# Patient Record
Sex: FEMALE | Race: WHITE | NOT HISPANIC OR LATINO | Employment: FULL TIME | URBAN - METROPOLITAN AREA
[De-identification: names, ages, dates, MRNs, and addresses within clinical notes are randomized per-mention and may not be internally consistent; named-entity substitution may affect disease eponyms.]

---

## 2017-03-16 ENCOUNTER — TRANSCRIBE ORDERS (OUTPATIENT)
Dept: ADMINISTRATIVE | Facility: HOSPITAL | Age: 47
End: 2017-03-16

## 2017-03-16 DIAGNOSIS — N63.0 LUMP OR MASS IN BREAST: Primary | ICD-10-CM

## 2017-03-21 ENCOUNTER — HOSPITAL ENCOUNTER (OUTPATIENT)
Dept: MAMMOGRAPHY | Facility: CLINIC | Age: 47
Discharge: HOME | End: 2017-03-21
Payer: COMMERCIAL

## 2017-03-21 ENCOUNTER — HOSPITAL ENCOUNTER (OUTPATIENT)
Dept: ULTRASOUND IMAGING | Facility: CLINIC | Age: 47
Discharge: HOME | End: 2017-03-21
Payer: COMMERCIAL

## 2017-03-21 DIAGNOSIS — N63.0 LUMP OR MASS IN BREAST: ICD-10-CM

## 2017-03-21 PROCEDURE — 76642 ULTRASOUND BREAST LIMITED: CPT

## 2017-03-21 PROCEDURE — G0204 DX MAMMO INCL CAD BI: HCPCS

## 2018-03-15 ENCOUNTER — OFFICE VISIT (OUTPATIENT)
Dept: OBGYN CLINIC | Facility: CLINIC | Age: 48
End: 2018-03-15
Payer: COMMERCIAL

## 2018-03-15 ENCOUNTER — APPOINTMENT (OUTPATIENT)
Dept: RADIOLOGY | Facility: CLINIC | Age: 48
End: 2018-03-15
Payer: COMMERCIAL

## 2018-03-15 VITALS — HEIGHT: 61 IN | SYSTOLIC BLOOD PRESSURE: 128 MMHG | DIASTOLIC BLOOD PRESSURE: 80 MMHG | HEART RATE: 95 BPM

## 2018-03-15 DIAGNOSIS — M25.561 RIGHT KNEE PAIN, UNSPECIFIED CHRONICITY: ICD-10-CM

## 2018-03-15 DIAGNOSIS — M70.50 ANSERINE BURSITIS: ICD-10-CM

## 2018-03-15 DIAGNOSIS — M17.11 PRIMARY LOCALIZED OSTEOARTHRITIS OF RIGHT KNEE: Primary | ICD-10-CM

## 2018-03-15 PROCEDURE — 20610 DRAIN/INJ JOINT/BURSA W/O US: CPT | Performed by: ORTHOPAEDIC SURGERY

## 2018-03-15 PROCEDURE — 73564 X-RAY EXAM KNEE 4 OR MORE: CPT

## 2018-03-15 PROCEDURE — 99213 OFFICE O/P EST LOW 20 MIN: CPT | Performed by: ORTHOPAEDIC SURGERY

## 2018-03-15 RX ORDER — BETAMETHASONE SODIUM PHOSPHATE AND BETAMETHASONE ACETATE 3; 3 MG/ML; MG/ML
6 INJECTION, SUSPENSION INTRA-ARTICULAR; INTRALESIONAL; INTRAMUSCULAR; SOFT TISSUE
Status: COMPLETED | OUTPATIENT
Start: 2018-03-15 | End: 2018-03-15

## 2018-03-15 RX ORDER — LIDOCAINE HYDROCHLORIDE 10 MG/ML
5 INJECTION, SOLUTION INFILTRATION; PERINEURAL
Status: COMPLETED | OUTPATIENT
Start: 2018-03-15 | End: 2018-03-15

## 2018-03-15 RX ORDER — SENNOSIDES 8.6 MG
650 CAPSULE ORAL EVERY 8 HOURS PRN
COMMUNITY

## 2018-03-15 RX ADMIN — BETAMETHASONE SODIUM PHOSPHATE AND BETAMETHASONE ACETATE 6 MG: 3; 3 INJECTION, SUSPENSION INTRA-ARTICULAR; INTRALESIONAL; INTRAMUSCULAR; SOFT TISSUE at 13:59

## 2018-03-15 RX ADMIN — LIDOCAINE HYDROCHLORIDE 5 ML: 10 INJECTION, SOLUTION INFILTRATION; PERINEURAL at 13:59

## 2018-03-15 NOTE — ASSESSMENT & PLAN NOTE
Right knee anserine bursitis from the recent injury  This is likely less problematic than her osteoarthritis  Will monitor the bursitis  If she fails to have improvement with the injection, rest, ice, and anti-inflammatories I will see her back in 3-4 weeks and consider an injection in the bursa if appropriate  She was counseled on stretching

## 2018-03-15 NOTE — PATIENT INSTRUCTIONS
Ice your knee 20-30 mins every hour whenever there is swelling  Also ice in the same way for several hours after activity that causes pain or swelling

## 2018-03-15 NOTE — ASSESSMENT & PLAN NOTE
Right knee osteoarthritis aggravated by the recent injury  Recommended icing regularly and anti-inflammatory use  I also offered her cortisone injection  Please refer to the procedure note  She has no restrictions  I will see her back as needed

## 2018-03-15 NOTE — PROGRESS NOTES
Assessment:     1  Right knee pain, unspecified chronicity        Plan:     Problem List Items Addressed This Visit        Musculoskeletal and Integument    Primary localized osteoarthritis of right knee - Primary     Right knee osteoarthritis aggravated by the recent injury  Recommended icing regularly and anti-inflammatory use  I also offered her cortisone injection  Please refer to the procedure note  She has no restrictions  I will see her back as needed  Relevant Orders    XR knee 4+ vw right injury    Anserine bursitis     Right knee anserine bursitis from the recent injury  This is likely less problematic than her osteoarthritis  Will monitor the bursitis  If she fails to have improvement with the injection, rest, ice, and anti-inflammatories I will see her back in 3-4 weeks and consider an injection in the bursa if appropriate  She was counseled on stretching  Subjective:     Patient ID: Zandra Mckeon is a 51 y o  female  Chief Complaint:  54-year-old female here today for her right knee  On February 13, 2018 she was squatting down taking care of her dogs, giving the bath  Since that time she has had severe pain in her right knee  She is having difficulty walking  Sitting for any length of time and then getting up is very difficult  She notes limping  She has trouble with plantar fasciitis when her knees are bad  She was given a cortisone injection three years ago in her right knee for arthritis and has been doing very well since that time  She points the medial aspect of the knee as to where the pain is  She has not been icing or taking anti-inflammatories        Allergy:  Allergies   Allergen Reactions    Augmentin Es-600  [Amoxicillin-Pot Clavulanate] Vomiting    Ciprofloxacin Vomiting    Mobic [Meloxicam]      Medications:  all current active meds have been reviewed  Past Medical History:  Past Medical History:   Diagnosis Date    Fractures     Osteoarthritis Past Surgical History:  Past Surgical History:   Procedure Laterality Date    APPENDECTOMY       SECTION      VARICOCELECTOMY       Family History:  Family History   Problem Relation Age of Onset    No Known Problems Mother     No Known Problems Father      Social History:  History   Alcohol Use    Yes     History   Drug Use No     History   Smoking Status    Current Every Day Smoker   Smokeless Tobacco    Never Used     Review of Systems   Constitutional: Negative  HENT: Negative  Eyes: Negative  Respiratory: Negative  Gastrointestinal: Negative  Endocrine: Negative  Genitourinary: Negative  Musculoskeletal: Positive for arthralgias and joint swelling  Skin: Negative  Allergic/Immunologic: Negative  Neurological: Negative  Hematological: Negative  Psychiatric/Behavioral: Negative  Objective:  BP Readings from Last 1 Encounters:   03/15/18 128/80      Wt Readings from Last 1 Encounters:   05/29/15 127 kg (279 lb)      BMI:   Estimated body mass index is 52 72 kg/m² as calculated from the following:    Height as of 5/29/15: 5' 1" (1 549 m)  Weight as of 5/29/15: 127 kg (279 lb)  BSA:   Estimated body surface area is 2 18 meters squared as calculated from the following:    Height as of 5/29/15: 5' 1" (1 549 m)  Weight as of 5/29/15: 127 kg (279 lb)  Physical Exam  Right Knee Exam     Comments:  Tender to palpation along the medial joint line as well as the hamstring tendons down to their insertion at the pes bursa  Knee range of motion is  degrees  The knee is stable to varus and valgus stress with a negative Lachman  No knee effusion  Pain and discomfort with any Ruby testing and difficult to localize specifically  No swelling, erythema              I have personally reviewed pertinent films in PACS and my interpretation is Moderate to severe osteoarthritis of the right knee with near complete collapse of the medial joint space        Large joint arthrocentesis  Date/Time: 3/15/2018 1:59 PM  Consent given by: patient  Timeout: Immediately prior to procedure a time out was called to verify the correct patient, procedure, equipment, support staff and site/side marked as required   Supporting Documentation  Indications: pain   Procedure Details  Location: knee -   Preparation: Patient was prepped and draped in the usual sterile fashion  Needle size: 22 G  Ultrasound guidance: no  Approach: superior  Medications administered: 5 mL lidocaine 1 %; 6 mg betamethasone acetate-betamethasone sodium phosphate 6 (3-3) mg/mL    Patient tolerance: patient tolerated the procedure well with no immediate complications  Dressing:  Sterile dressing applied

## 2018-07-27 ENCOUNTER — OFFICE VISIT (OUTPATIENT)
Dept: OBGYN CLINIC | Facility: CLINIC | Age: 48
End: 2018-07-27
Payer: COMMERCIAL

## 2018-07-27 VITALS
SYSTOLIC BLOOD PRESSURE: 118 MMHG | WEIGHT: 271.6 LBS | HEIGHT: 61 IN | BODY MASS INDEX: 51.28 KG/M2 | DIASTOLIC BLOOD PRESSURE: 80 MMHG | HEART RATE: 69 BPM

## 2018-07-27 DIAGNOSIS — M17.0 PRIMARY OSTEOARTHRITIS OF BOTH KNEES: Primary | ICD-10-CM

## 2018-07-27 PROCEDURE — 99213 OFFICE O/P EST LOW 20 MIN: CPT | Performed by: ORTHOPAEDIC SURGERY

## 2018-07-27 PROCEDURE — 20610 DRAIN/INJ JOINT/BURSA W/O US: CPT | Performed by: ORTHOPAEDIC SURGERY

## 2018-07-27 RX ORDER — BETAMETHASONE SODIUM PHOSPHATE AND BETAMETHASONE ACETATE 3; 3 MG/ML; MG/ML
6 INJECTION, SUSPENSION INTRA-ARTICULAR; INTRALESIONAL; INTRAMUSCULAR; SOFT TISSUE
Status: COMPLETED | OUTPATIENT
Start: 2018-07-27 | End: 2018-07-27

## 2018-07-27 RX ORDER — LIDOCAINE HYDROCHLORIDE 10 MG/ML
5 INJECTION, SOLUTION INFILTRATION; PERINEURAL
Status: COMPLETED | OUTPATIENT
Start: 2018-07-27 | End: 2018-07-27

## 2018-07-27 RX ADMIN — LIDOCAINE HYDROCHLORIDE 5 ML: 10 INJECTION, SOLUTION INFILTRATION; PERINEURAL at 14:37

## 2018-07-27 RX ADMIN — BETAMETHASONE SODIUM PHOSPHATE AND BETAMETHASONE ACETATE 6 MG: 3; 3 INJECTION, SUSPENSION INTRA-ARTICULAR; INTRALESIONAL; INTRAMUSCULAR; SOFT TISSUE at 14:37

## 2018-07-27 NOTE — PROGRESS NOTES
Assessment:   No diagnosis found  Plan:     Problem List Items Addressed This Visit        Musculoskeletal and Integument    Primary osteoarthritis of both knees - Primary     50-year-old female with bilateral knee osteoarthritis  She wished to proceed with cortisone injections today  Please refer to the procedure note  We had a lengthy discussion regarding total knee replacements and weight loss  I do think that if she can lose a significant amount of weight it will likely delayed when she needs knee replacement by years  I spent time with her talking about appropriate ways to lose weight including changes in her eating habits, joining weight watchers, low-impact exercising  She is working with her primary care doctor on his well and encouraged her to continue working on this  I will see her back as needed  Relevant Orders    Large joint arthrocentesis    Large joint arthrocentesis           Patient ID: Jada Reaves is a 51 y o  female  Chief Complaint:  Bilateral knee pain    Subjective:  50-year-old female with bilateral knee pain  The right she has gotten injections in occasionally and has good relief with those for quite a period of time  Most recent one was over three months ago  The right knee is bothering her a lot and she is getting sharp pain in the knee, primarily around the medial joint line  Left knee also is bothering her  She feels like it is buckling especially if she is doing something like stairs  She does not trust it  She has pain in the front of her knee as well  She is working with her primary care doctor and weight loss, but feels very overwhelmed that she is not able to do it  Allergy:  Allergies   Allergen Reactions    Augmentin Es-600  [Amoxicillin-Pot Clavulanate] Vomiting    Ciprofloxacin Vomiting    Mobic [Meloxicam]        Medications:  all current active meds have been reviewed    ROS:  Review of Systems   Constitutional: Negative      HENT: Negative  Eyes: Negative  Respiratory: Negative  Gastrointestinal: Negative  Endocrine: Negative  Genitourinary: Negative  Musculoskeletal: Positive for arthralgias and joint swelling  Skin: Negative  Allergic/Immunologic: Negative  Neurological: Negative  Hematological: Negative  Psychiatric/Behavioral: Negative          Objective:  BP Readings from Last 1 Encounters:   07/27/18 118/80      Wt Readings from Last 1 Encounters:   07/27/18 123 kg (271 lb 9 6 oz)        Exam:   Physical Exam  Right Knee Exam     Comments:  Range of motion:  , stable to varus and valgus stress, medial joint line tenderness, no other tenderness, crepitus on knee range of motion, pain with range of motion, discomfort with Ruby's, no knee effusion      Left Knee Exam     Comments:  Stable to varus and valgus stress, patellofemoral pain, crepitus on range of motion, Range of motion:  0-130, no knee effusion, negative Ruby's                  Large joint arthrocentesis  Date/Time: 7/27/2018 2:37 PM  Consent given by: patient  Timeout: Immediately prior to procedure a time out was called to verify the correct patient, procedure, equipment, support staff and site/side marked as required   Supporting Documentation  Indications: pain   Procedure Details  Location: knee - R knee  Preparation: Patient was prepped and draped in the usual sterile fashion  Needle size: 22 G  Ultrasound guidance: no  Approach: superior  Medications administered: 6 mg betamethasone acetate-betamethasone sodium phosphate 6 (3-3) mg/mL; 5 mL lidocaine 1 %    Patient tolerance: patient tolerated the procedure well with no immediate complications  Dressing:  Sterile dressing applied  Large joint arthrocentesis  Date/Time: 7/27/2018 2:37 PM  Consent given by: patient  Timeout: Immediately prior to procedure a time out was called to verify the correct patient, procedure, equipment, support staff and site/side marked as required Supporting Documentation  Indications: pain   Procedure Details  Location: knee - L knee  Preparation: Patient was prepped and draped in the usual sterile fashion  Needle size: 22 G  Ultrasound guidance: no  Approach: superior  Medications administered: 6 mg betamethasone acetate-betamethasone sodium phosphate 6 (3-3) mg/mL; 5 mL lidocaine 1 %    Patient tolerance: patient tolerated the procedure well with no immediate complications  Dressing:  Sterile dressing applied

## 2018-07-27 NOTE — ASSESSMENT & PLAN NOTE
50-year-old female with bilateral knee osteoarthritis  She wished to proceed with cortisone injections today  Please refer to the procedure note  We had a lengthy discussion regarding total knee replacements and weight loss  I do think that if she can lose a significant amount of weight it will likely delayed when she needs knee replacement by years  I spent time with her talking about appropriate ways to lose weight including changes in her eating habits, joining weight watchers, low-impact exercising  She is working with her primary care doctor on his well and encouraged her to continue working on this  I will see her back as needed

## 2021-01-08 ENCOUNTER — IMMUNIZATIONS (OUTPATIENT)
Dept: FAMILY MEDICINE CLINIC | Facility: HOSPITAL | Age: 51
End: 2021-01-08

## 2021-01-08 DIAGNOSIS — Z23 ENCOUNTER FOR IMMUNIZATION: ICD-10-CM

## 2021-01-08 PROCEDURE — 91301 SARS-COV-2 / COVID-19 MRNA VACCINE (MODERNA) 100 MCG: CPT

## 2021-01-08 PROCEDURE — 0011A SARS-COV-2 / COVID-19 MRNA VACCINE (MODERNA) 100 MCG: CPT

## 2021-02-04 ENCOUNTER — IMMUNIZATIONS (OUTPATIENT)
Dept: FAMILY MEDICINE CLINIC | Facility: HOSPITAL | Age: 51
End: 2021-02-04

## 2021-02-04 DIAGNOSIS — Z23 ENCOUNTER FOR IMMUNIZATION: Primary | ICD-10-CM

## 2021-02-04 PROCEDURE — 91301 SARS-COV-2 / COVID-19 MRNA VACCINE (MODERNA) 100 MCG: CPT

## 2021-02-04 PROCEDURE — 0012A SARS-COV-2 / COVID-19 MRNA VACCINE (MODERNA) 100 MCG: CPT

## 2021-12-13 ENCOUNTER — OFFICE VISIT (OUTPATIENT)
Dept: URGENT CARE | Facility: CLINIC | Age: 51
End: 2021-12-13
Payer: COMMERCIAL

## 2021-12-13 ENCOUNTER — APPOINTMENT (OUTPATIENT)
Dept: RADIOLOGY | Facility: CLINIC | Age: 51
End: 2021-12-13
Payer: COMMERCIAL

## 2021-12-13 VITALS
BODY MASS INDEX: 49.65 KG/M2 | WEIGHT: 263 LBS | DIASTOLIC BLOOD PRESSURE: 65 MMHG | HEIGHT: 61 IN | HEART RATE: 60 BPM | SYSTOLIC BLOOD PRESSURE: 130 MMHG | OXYGEN SATURATION: 96 % | TEMPERATURE: 96.5 F | RESPIRATION RATE: 20 BRPM

## 2021-12-13 DIAGNOSIS — R07.0 THROAT DISCOMFORT: Primary | ICD-10-CM

## 2021-12-13 DIAGNOSIS — R07.0 THROAT DISCOMFORT: ICD-10-CM

## 2021-12-13 PROCEDURE — 71046 X-RAY EXAM CHEST 2 VIEWS: CPT

## 2021-12-13 PROCEDURE — S9083 URGENT CARE CENTER GLOBAL: HCPCS

## 2021-12-13 PROCEDURE — G0382 LEV 3 HOSP TYPE B ED VISIT: HCPCS

## 2022-01-13 ENCOUNTER — OCCMED (OUTPATIENT)
Dept: URGENT CARE | Facility: CLINIC | Age: 52
End: 2022-01-13
Payer: OTHER MISCELLANEOUS

## 2022-01-13 ENCOUNTER — APPOINTMENT (OUTPATIENT)
Dept: RADIOLOGY | Facility: CLINIC | Age: 52
End: 2022-01-13
Payer: OTHER MISCELLANEOUS

## 2022-01-13 ENCOUNTER — TELEPHONE (OUTPATIENT)
Dept: URGENT CARE | Facility: CLINIC | Age: 52
End: 2022-01-13

## 2022-01-13 DIAGNOSIS — M25.511 ACUTE PAIN OF RIGHT SHOULDER: ICD-10-CM

## 2022-01-13 DIAGNOSIS — R07.81 RIB PAIN ON RIGHT SIDE: ICD-10-CM

## 2022-01-13 DIAGNOSIS — M25.511 ACUTE PAIN OF RIGHT SHOULDER: Primary | ICD-10-CM

## 2022-01-13 PROCEDURE — 99283 EMERGENCY DEPT VISIT LOW MDM: CPT | Performed by: PHYSICIAN ASSISTANT

## 2022-01-13 PROCEDURE — 73030 X-RAY EXAM OF SHOULDER: CPT

## 2022-01-13 PROCEDURE — G0382 LEV 3 HOSP TYPE B ED VISIT: HCPCS | Performed by: PHYSICIAN ASSISTANT

## 2022-01-13 PROCEDURE — 71101 X-RAY EXAM UNILAT RIBS/CHEST: CPT

## 2022-01-13 NOTE — TELEPHONE ENCOUNTER
Spoke with patient  Patient was told Xray report was read as Hazy groundglass opacities at both lung bases, new from prior study, concerning for atypical infectious process  In the setting of clinically suspected/proven COVID-19, the above lung parenchymal findings on CT indicate high confidence level for   COVID-19   2   No evidence of rib fractures  Patient was called and reports no signs of illness and that she was tested on 1/10/22 and was negative for COVID 19  Patient was told I recommend she contact PCP for further testing and possible New COVId 19 testing  Patient understood  Patient also reports she had the COVID 19 booster recently  Patient was also told a possible CT is recommend  Patient understood

## 2022-01-19 ENCOUNTER — APPOINTMENT (OUTPATIENT)
Dept: URGENT CARE | Facility: CLINIC | Age: 52
End: 2022-01-19
Payer: OTHER MISCELLANEOUS

## 2022-01-19 PROCEDURE — 99213 OFFICE O/P EST LOW 20 MIN: CPT

## 2023-08-29 ENCOUNTER — TELEPHONE (OUTPATIENT)
Age: 53
End: 2023-08-29

## 2023-08-29 ENCOUNTER — PREP FOR PROCEDURE (OUTPATIENT)
Age: 53
End: 2023-08-29

## 2023-08-29 DIAGNOSIS — Z12.11 COLON CANCER SCREENING: Primary | ICD-10-CM

## 2023-08-29 NOTE — TELEPHONE ENCOUNTER
Scheduled date of colonoscopy (as of today):    Physician performing colonoscopy:    Location of colonoscopy:[facility]    Bowel prep reviewed with patient:yes    Instructions reviewed with patient by: Brian Frances Assessment    Name: Jonatan Mcclure  YOB: 1970  Last Height: 5' 1" (1.549 m)  Last weight: 119 kg (263 lb)  BMI: None  Procedure: Colon  Diagnosis: screening crc  Date of procedure: 9/11/2023  Prep: ordered  Responsible : spouse  Phone#: [phone]  Name completing form: Rikki Malin MA  Date form completed: 08/29/23      If the patient answers yes to any of these questions, schedule in a hospital  Are you pregnant: No  Do you rely on a wheelchair for mobility: No  Have you been diagnosed with End Stage Renal Disease (ESRD): No  Do you need oxygen during the day: No  Have you had a heart attack or stroke within the past three months: No  Have you had a seizure within the past three months: No  Have you ever been informed by anesthesia that you have a difficult airway: No  Additional Questions  Have you had any cardiac testing or are under the care of a Cardiologist (see cardiac list): No  Cardiac list:   Do you have an implanted cardiac defibrillator: No (Comment:  This patient should be scheduled in the hospital)    Have any bleeding problems, such as anemia or hemophilia (If patient has H&H result below 8, schedule in hospital.  H&H must be within 30 days of procedure): No    Had an organ transplant within the past 3 months: No    Do you have any present infections: No  Do you get short of breath when walking a few blocks: No  Have you been diagnosed with diabetes: No  Comments (provide cardiac provider information if applicable):

## 2023-08-29 NOTE — TELEPHONE ENCOUNTER
08/29/23  Screened by: Abdirashid Jones MA    Referring Provider pcp    Pre- Screening: There is no height or weight on file to calculate BMI. Has patient been referred for a routine screening Colonoscopy? yes  Is the patient between 43-73 years old? yes      Previous Colonoscopy no   If yes:    Date:     Facility:     Reason:       SCHEDULING STAFF: If the patient is between 45yrs-49yrs, please advise patient to confirm benefits/coverage with their insurance company for a routine screening colonoscopy, some insurance carriers will only cover at [facility], [place] or [facility]. If the patient is over 66years old, please schedule an office visit. Does the patient want to see a Gastroenterologist prior to their procedure OR are they having any GI symptoms? no    Has the patient been hospitalized or had abdominal surgery in the past 6 months? no    Does the patient use supplemental oxygen? no    Does the patient take Coumadin, Lovenox, Plavix, Elliquis, Xarelto, or other blood thinning medication? no    Has the patient had a stroke, cardiac event, or stent placed in the past year? no    SCHEDULING STAFF: If patient answers NO to above questions, then schedule procedure. If patient answers YES to above questions, then schedule office appointment. Passed oa     If patient is between 45yrs - 49yrs, please advise patient that we will have to confirm benefits & coverage with their insurance company for a routine screening colonoscopy.

## 2023-08-29 NOTE — TELEPHONE ENCOUNTER
Prep emailed [email or web address]         COLONOSCOPY  MIRALAX/Dulcolax Bowel Preparation Instructions    The OR/GI Lab will contact you the evening prior to your procedure with your exact arrival time. Our practice requires a 1 week notice for any cancellations or rescheduling. We kindly ask that you immediately notify us of any changes including any new medications that are prescribed. Thank you for your cooperation. WEEK BEFORE YOUR PROCEDURE:  • Stop taking Iron tablets. • 5 days prior, AVOID vegetables and fruits with skins or seeds, nuts, corn, popcorn and whole grain breads. • Purchase: One (1) 238-gram container of Miralax (polyethylene glycol 3350), four (4) 5 mg Dulcolax (bisacodyl) tablets, and one (1) 64-ounce bottle of Gatorade (sports drink) - no red, orange, or purple. These may be purchased at any pharmacy without a prescription. Generic products are permissible. • Arrange responsible transportation for day of the procedure. DAY BEFORE THE PROCEDURE:   • CLEAR liquids only for entire day prior. Nothing red, orange or purple. • You MAY have:                                                               - Soda  - Water  - Broth - Gatorade  - Jello  - Popsicles - Coffee/tea without milk/creamer     • YOU MAY NOT HAVE:  o Solid foods   o Milk and milk products    o Juice with pulp    BOWEL PREPARATION:  Includes: One (1) 238-gram container of Miralax (polyethylene glycol 3350), four (4) 5 mg Dulcolax (bisacodyl) tablets, and one (1) 64-ounce bottle of Gatorade (sports drink). Preparation may be refrigerated. Entire bowel prep should be completed. Afternoon before the procedure (2:00 pm - 5:00 pm): • Take two (2) 5 mg Dulcolax laxative tablets. Evening before the procedure (6:00 pm):  • Mix entire container of Miralax with one (1) 64-ounce bottle of Gatorade and shake until all medication is dissolved. • Begin drinking solution.  Drink an eight (8) ounce cup every 10-15 minutes until you have consumed half (32 ounces) of the solution. Refrigerate remaining solution. Night before the procedure (8:00 pm): • Take two (2) 5 mg Dulcolax laxative tablets. Beginning 5 hours before your procedure:  • Drink the remaining amount of prepared solution (32 ounces). Drink an eight (8) ounce cup every 10-15 minutes until you have consumed the remaining solution. Bowel prep should be completed 4 hours prior to procedure time. NOTHING TO EAT OR DRINK AFTER MIDNIGHT- EXCEPT FOR YOUR PREP    DAY OF THE PROCEDURE:  • You may brush your teeth. • Leave all jewelry at home. • Please arrive for your procedure as indicated by the OR / GI Lab / Endoscopy Unit. The hospital will contact you the day before with your exact arrival time. • Make sure you have arranged ahead of time for a responsible adult (18 or older) to accompany and drive you home after the procedure. Please discuss any transportation concerns with our staff prior to your procedure. • The effects of the anesthesia can persist for 24 hours. After receiving the sedation, you must exercise caution before engaging in any activity that could harm yourself and others (such as driving a car). Do not make any important decisions or do not drink any alcoholic beverages during this time period. • After your procedure, you may have anything you'd like to eat or drink. You will probably want to start with something light. Please include plenty of fluids. Avoid items that cause gas such as sodas and salads. SPECIAL INSTRUCTIONS:    For patients currently taking blood thinners and/or antiplatelet therapy our office will contact the prescribing provider. Our office will contact you with any required changes to your medication regimen. Blood thinner (i.e. - Coumadin, Pradaxa, Lovenox, Xarelto, Eliquis)  ? Continue (Do Not Stop)  ? Stop______________for_____________days prior to the procedure.     Antiplatelet (i.e. - Plavix, Aggrenox, Effient, Brilinta)  ? Continue (Do Not Stop)  ? Stop______________for_____________days prior to the procedure. Diabetes:   • If you are Diabetic, please see separate Diabetic Instruction Sheet. Prescribed medications:  • Do not stop your aspirin, or any of your other medications (unless instructed otherwise). Take the rest of your prescribed medications with small sips of water at least 2 hours prior to your procedure. For any questions or concerns related to your bowel preparation or pre-procedure instructions, please contact our office at [phone]. Thank you for choosing [facility]!

## 2023-08-30 ENCOUNTER — TELEPHONE (OUTPATIENT)
Dept: GASTROENTEROLOGY | Facility: CLINIC | Age: 53
End: 2023-08-30

## 2023-08-30 NOTE — TELEPHONE ENCOUNTER
Left message for patient to call back and reschedule colonoscopy to hospital due to high BMI (49.69). Needs to be 45 or lower to have procedure at [address] [place] [address].

## 2023-09-06 NOTE — TELEPHONE ENCOUNTER
Scheduled date of colonoscopy (as of today): 10/10/23    Physician performing colonoscopy: Dorene     Location of colonoscopy: [facility]

## 2023-10-09 RX ORDER — SODIUM CHLORIDE 9 MG/ML
100 INJECTION, SOLUTION INTRAVENOUS CONTINUOUS
Status: CANCELLED | OUTPATIENT
Start: 2023-10-09

## 2023-10-10 ENCOUNTER — HOSPITAL ENCOUNTER (OUTPATIENT)
Dept: GASTROENTEROLOGY | Facility: HOSPITAL | Age: 53
Setting detail: OUTPATIENT SURGERY
Discharge: HOME | End: 2023-10-10
Payer: COMMERCIAL

## 2023-10-10 ENCOUNTER — ANESTHESIA (OUTPATIENT)
Dept: GASTROENTEROLOGY | Facility: HOSPITAL | Age: 53
End: 2023-10-10

## 2023-10-10 ENCOUNTER — ANESTHESIA EVENT (OUTPATIENT)
Dept: GASTROENTEROLOGY | Facility: HOSPITAL | Age: 53
End: 2023-10-10

## 2023-10-10 VITALS
RESPIRATION RATE: 20 BRPM | TEMPERATURE: 98.2 F | OXYGEN SATURATION: 95 % | HEIGHT: 61 IN | DIASTOLIC BLOOD PRESSURE: 53 MMHG | SYSTOLIC BLOOD PRESSURE: 107 MMHG | WEIGHT: 269 LBS | HEART RATE: 58 BPM | BODY MASS INDEX: 50.79 KG/M2

## 2023-10-10 DIAGNOSIS — Z12.11 COLON CANCER SCREENING: ICD-10-CM

## 2023-10-10 PROBLEM — G47.30 SLEEP APNEA: Status: ACTIVE | Noted: 2023-10-10

## 2023-10-10 PROBLEM — Z98.890 PONV (POSTOPERATIVE NAUSEA AND VOMITING): Status: ACTIVE | Noted: 2023-10-10

## 2023-10-10 PROBLEM — G45.9 TIA (TRANSIENT ISCHEMIC ATTACK): Status: ACTIVE | Noted: 2023-10-10

## 2023-10-10 PROBLEM — R11.2 PONV (POSTOPERATIVE NAUSEA AND VOMITING): Status: ACTIVE | Noted: 2023-10-10

## 2023-10-10 PROCEDURE — 88305 TISSUE EXAM BY PATHOLOGIST: CPT | Performed by: PATHOLOGY

## 2023-10-10 RX ORDER — SODIUM CHLORIDE 9 MG/ML
INJECTION, SOLUTION INTRAVENOUS CONTINUOUS PRN
Status: DISCONTINUED | OUTPATIENT
Start: 2023-10-10 | End: 2023-10-10

## 2023-10-10 RX ORDER — PROPOFOL 10 MG/ML
INJECTION, EMULSION INTRAVENOUS AS NEEDED
Status: DISCONTINUED | OUTPATIENT
Start: 2023-10-10 | End: 2023-10-10

## 2023-10-10 RX ORDER — LIDOCAINE HYDROCHLORIDE 10 MG/ML
INJECTION, SOLUTION EPIDURAL; INFILTRATION; INTRACAUDAL; PERINEURAL AS NEEDED
Status: DISCONTINUED | OUTPATIENT
Start: 2023-10-10 | End: 2023-10-10

## 2023-10-10 RX ADMIN — PROPOFOL 40 MG: 10 INJECTION, EMULSION INTRAVENOUS at 07:56

## 2023-10-10 RX ADMIN — LIDOCAINE HYDROCHLORIDE 30 MG: 10 INJECTION, SOLUTION EPIDURAL; INFILTRATION; INTRACAUDAL; PERINEURAL at 07:36

## 2023-10-10 RX ADMIN — PROPOFOL 130 MG: 10 INJECTION, EMULSION INTRAVENOUS at 07:36

## 2023-10-10 RX ADMIN — PROPOFOL 20 MG: 10 INJECTION, EMULSION INTRAVENOUS at 07:41

## 2023-10-10 RX ADMIN — PROPOFOL 20 MG: 10 INJECTION, EMULSION INTRAVENOUS at 08:00

## 2023-10-10 RX ADMIN — PROPOFOL 50 MG: 10 INJECTION, EMULSION INTRAVENOUS at 07:44

## 2023-10-10 RX ADMIN — SODIUM CHLORIDE: 0.9 INJECTION, SOLUTION INTRAVENOUS at 07:31

## 2023-10-10 RX ADMIN — PROPOFOL 40 MG: 10 INJECTION, EMULSION INTRAVENOUS at 07:52

## 2023-10-10 RX ADMIN — PROPOFOL 30 MG: 10 INJECTION, EMULSION INTRAVENOUS at 07:40

## 2023-10-10 RX ADMIN — PROPOFOL 50 MG: 10 INJECTION, EMULSION INTRAVENOUS at 07:48

## 2023-10-10 RX ADMIN — PROPOFOL 40 MG: 10 INJECTION, EMULSION INTRAVENOUS at 07:59

## 2023-10-10 NOTE — ANESTHESIA POSTPROCEDURE EVALUATION
Post-Op Assessment Note    CV Status:  Stable    Pain management: adequate     Mental Status:  Awake and sleepy   Hydration Status:  Euvolemic   PONV Controlled:  Controlled   Airway Patency:  Patent      Post Op Vitals Reviewed: Yes      Staff: CRNA         There were no known notable events for this encounter.     /56 (10/10/23 0807)    Temp      Pulse 58 (10/10/23 0807)   Resp 18 (10/10/23 0807)    SpO2 100 % (10/10/23 0807)

## 2023-10-10 NOTE — H&P
History and Physical - [facility]  [facility] 46 y.o. female MRN: [id number]                  HPI: [facility] is a 46y.o. year old female who presents for colonoscopy      REVIEW OF SYSTEMS: Per the HPI, and otherwise unremarkable. Historical Information   Past Medical History:   Diagnosis Date    Fractures     Osteoarthritis      Past Surgical History:   Procedure Laterality Date    APPENDECTOMY       SECTION      VARICOCELECTOMY       Social History   Social History     Substance and Sexual Activity   Alcohol Use Yes     Social History     Substance and Sexual Activity   Drug Use No     Social History     Tobacco Use   Smoking Status Every Day   Smokeless Tobacco Never     Family History   Problem Relation Age of Onset    No Known Problems Mother     No Known Problems Father        Meds/Allergies       Current Outpatient Medications:     acetaminophen (TYLENOL) 650 mg CR tablet    PROAIR  (90 Base) MCG/ACT inhaler    Allergies   Allergen Reactions    Augmentin Es-600  [Amoxicillin-Pot Clavulanate] Vomiting    Ciprofloxacin Vomiting    Mobic [Meloxicam]        Objective     /57   Pulse 61   Temp 98.2 °F (36.8 °C) (Oral)   Resp 18   Ht 5' 1" (1.549 m)   Wt 122 kg (269 lb)   SpO2 94%   BMI 50.83 kg/m²       PHYSICAL EXAM    Gen: NAD  Head: NCAT  CV: RRR  CHEST: Clear  ABD: soft, NT/ND  EXT: no edema      ASSESSMENT/PLAN:  This is a 46y.o. year old female here for colonoscopy, and she is stable and optimized for her procedure.

## 2023-10-10 NOTE — ANESTHESIA PREPROCEDURE EVALUATION
Procedure:  COLONOSCOPY    Relevant Problems   ANESTHESIA   (+) PONV (postoperative nausea and vomiting) (With c-sections)      CARDIO (within normal limits)      NEURO/PSYCH   (+) TIA (transient ischemic attack) (remote)      PULMONARY  URI 2-3 weeks ago, resolved   (+) Sleep apnea (Highly suspected per spouse report)   (-) Smoking   (-) URI (upper respiratory infection)    BMI 50    Physical Exam    Airway    Mallampati score: II  TM Distance: >3 FB  Neck ROM: full     Dental   No notable dental hx     Cardiovascular      Pulmonary      Other Findings        Anesthesia Plan  ASA Score- 3     Anesthesia Type- IV sedation with anesthesia with ASA Monitors. Additional Monitors:     Airway Plan:            Plan Factors-Exercise tolerance (METS): >4 METS. Chart reviewed. Existing labs reviewed. Patient summary reviewed. Patient is not a current smoker. Induction- intravenous. Postoperative Plan-     Informed Consent- Anesthetic plan and risks discussed with patient and spouse. I personally reviewed this patient with the CRNA. Discussed and agreed on the Anesthesia Plan with the CRNA. Darwin Fong

## 2023-10-13 PROCEDURE — 88305 TISSUE EXAM BY PATHOLOGIST: CPT | Performed by: PATHOLOGY

## 2025-04-17 ENCOUNTER — TELEPHONE (OUTPATIENT)
Dept: OBGYN CLINIC | Facility: CLINIC | Age: 55
End: 2025-04-17

## 2025-04-17 NOTE — TELEPHONE ENCOUNTER
LVM for patient on cell to call back and reschedule appointment with . Unable to leave VM on home phone

## 2025-04-23 ENCOUNTER — APPOINTMENT (OUTPATIENT)
Dept: RADIOLOGY | Facility: CLINIC | Age: 55
End: 2025-04-23
Attending: ORTHOPAEDIC SURGERY
Payer: COMMERCIAL

## 2025-04-23 ENCOUNTER — OFFICE VISIT (OUTPATIENT)
Dept: OBGYN CLINIC | Facility: CLINIC | Age: 55
End: 2025-04-23
Payer: COMMERCIAL

## 2025-04-23 VITALS — WEIGHT: 253 LBS | HEIGHT: 61 IN | BODY MASS INDEX: 47.77 KG/M2

## 2025-04-23 DIAGNOSIS — M25.561 CHRONIC PAIN OF RIGHT KNEE: ICD-10-CM

## 2025-04-23 DIAGNOSIS — G89.29 CHRONIC PAIN OF RIGHT KNEE: ICD-10-CM

## 2025-04-23 DIAGNOSIS — M17.11 PRIMARY OSTEOARTHRITIS OF RIGHT KNEE: Primary | ICD-10-CM

## 2025-04-23 PROCEDURE — 20610 DRAIN/INJ JOINT/BURSA W/O US: CPT | Performed by: ORTHOPAEDIC SURGERY

## 2025-04-23 PROCEDURE — 73564 X-RAY EXAM KNEE 4 OR MORE: CPT

## 2025-04-23 PROCEDURE — 99203 OFFICE O/P NEW LOW 30 MIN: CPT | Performed by: ORTHOPAEDIC SURGERY

## 2025-04-23 RX ORDER — LIDOCAINE HYDROCHLORIDE 10 MG/ML
7 INJECTION, SOLUTION EPIDURAL; INFILTRATION; INTRACAUDAL; PERINEURAL
Status: COMPLETED | OUTPATIENT
Start: 2025-04-23 | End: 2025-04-23

## 2025-04-23 RX ORDER — BETAMETHASONE SODIUM PHOSPHATE AND BETAMETHASONE ACETATE 3; 3 MG/ML; MG/ML
6 INJECTION, SUSPENSION INTRA-ARTICULAR; INTRALESIONAL; INTRAMUSCULAR; SOFT TISSUE
Status: COMPLETED | OUTPATIENT
Start: 2025-04-23 | End: 2025-04-23

## 2025-04-23 RX ADMIN — BETAMETHASONE SODIUM PHOSPHATE AND BETAMETHASONE ACETATE 6 MG: 3; 3 INJECTION, SUSPENSION INTRA-ARTICULAR; INTRALESIONAL; INTRAMUSCULAR; SOFT TISSUE at 14:30

## 2025-04-23 RX ADMIN — LIDOCAINE HYDROCHLORIDE 7 ML: 10 INJECTION, SOLUTION EPIDURAL; INFILTRATION; INTRACAUDAL; PERINEURAL at 14:30

## 2025-04-23 NOTE — PROGRESS NOTES
Assessment:     1. Primary osteoarthritis of right knee    2. Chronic pain of right knee        Plan:     Problem List Items Addressed This Visit          Musculoskeletal and Integument    Primary osteoarthritis of right knee - Primary    Findings consistent with right knee osteoarthritis.  Right knee x-ray reviewed in office with patient today. Prognosis of her condition reviewed. Discussed conservative treatment options today. Patient was given cortisone injection today, tolerated procedure well, cold compress today. Repeat in 3-4 months if needed. She can call in 4 weeks if pain continues and referral can be placed for gel injections. Stationary bike, elliptical for low impact exercise.  Over-the-counter NSAID, topical voltaren gel for pain control. Discussed she is not surgical candidate due to BMI and recommend discussing with PCP about options for weight loss. Patient declined referral to pain management. She will hold 3 month date for CSI if needed. All patient's questions were answered to her satisfaction.  This note is created using dictation transcription.  It may contain typographical errors, grammatical errors, improperly dictated words, background noise and other errors.         Relevant Medications    lidocaine (PF) (XYLOCAINE-MPF) 1 % injection 7 mL (Completed)    betamethasone acetate-betamethasone sodium phosphate (CELESTONE) injection 6 mg (Completed)    Other Relevant Orders    Large joint arthrocentesis: R knee (Completed)     Other Visit Diagnoses         Chronic pain of right knee        Relevant Orders    XR knee 4+ vw right injury           Subjective:     Patient ID: Cleopatra Barone is a 55 y.o. female.  Chief Complaint:  55 yr old female in for evaluation of right knee pain. She treated with Dr Banegas in past for osteoarthritis. She had CSI which gave 7 yrs of relief. She states past few weeks she has noticed return of pain in knee w/o injury or trauma. Pain global in nature. She notes  grinding, catching sensation with motion of her knee. Pain can be mild, ache at sedentary to more severe in nature with walking, stairs, getting up from seated positions. End of day she is barely able to walk/stand. She is walking unassisted. No reported surgeries. No instability or locking. She wants to discuss treatment options today. 8/10 pain today.     Allergy:  Allergies   Allergen Reactions    Augmentin Es-600  [Amoxicillin-Pot Clavulanate] Vomiting    Ciprofloxacin Vomiting    Mobic [Meloxicam]      Medications:  all current active meds have been reviewed  Past Medical History:  Past Medical History:   Diagnosis Date    Fractures     Osteoarthritis      Past Surgical History:  Past Surgical History:   Procedure Laterality Date    APPENDECTOMY       SECTION      VARICOCELECTOMY       Family History:  Family History   Problem Relation Age of Onset    No Known Problems Mother     No Known Problems Father      Social History:  Social History     Substance and Sexual Activity   Alcohol Use Yes     Social History     Substance and Sexual Activity   Drug Use No     Social History     Tobacco Use   Smoking Status Every Day   Smokeless Tobacco Never     Review of Systems   Constitutional:  Negative for chills and fever.   HENT:  Negative for ear pain and sore throat.    Eyes:  Negative for pain and visual disturbance.   Respiratory:  Negative for cough and shortness of breath.    Cardiovascular:  Negative for chest pain and palpitations.   Gastrointestinal:  Negative for abdominal pain and vomiting.   Genitourinary:  Negative for dysuria and hematuria.   Musculoskeletal:  Positive for arthralgias (right knee). Negative for back pain.   Skin:  Negative for color change and rash.   Neurological:  Negative for seizures and syncope.   All other systems reviewed and are negative.        Objective:  BP Readings from Last 1 Encounters:   10/10/23 107/53      Wt Readings from Last 1 Encounters:   25 115 kg (253  "lb)      BMI:   Estimated body mass index is 47.8 kg/m² as calculated from the following:    Height as of this encounter: 5' 1\" (1.549 m).    Weight as of this encounter: 115 kg (253 lb).  BSA:   Estimated body surface area is 2.09 meters squared as calculated from the following:    Height as of this encounter: 5' 1\" (1.549 m).    Weight as of this encounter: 115 kg (253 lb).   Physical Exam  Constitutional:       Appearance: She is well-developed.   Eyes:      Pupils: Pupils are equal, round, and reactive to light.   Pulmonary:      Effort: Pulmonary effort is normal.      Breath sounds: Normal breath sounds.   Musculoskeletal:         General: Tenderness (right knee arthralgia) present.      Right knee: No effusion.   Skin:     General: Skin is warm and dry.   Neurological:      Mental Status: She is alert and oriented to person, place, and time.      Deep Tendon Reflexes: Reflexes are normal and symmetric.   Psychiatric:         Behavior: Behavior normal.         Thought Content: Thought content normal.         Judgment: Judgment normal.       Right Knee Exam     Muscle Strength   The patient has normal right knee strength.    Tenderness   The patient is experiencing tenderness in the patella and lateral joint line.    Range of Motion   Extension:  0   Flexion:  110     Tests   Varus: negative Valgus: negative  Lachman:  Anterior - negative        Other   Erythema: absent  Scars: absent  Sensation: normal  Pulse: present  Swelling: none  Effusion: no effusion present    Comments:  Crepitation with motion of patella             I have personally reviewed pertinent films in PACS and my interpretation is x-ray right knee osteoarthritis, more pronounced patellofemoral compartment with osteophytes through out , no calcifications.    Large joint arthrocentesis: R knee  Universal Protocol:  Consent: Verbal consent obtained.  Risks and benefits: risks, benefits and alternatives were discussed  Consent given by: " patient  Patient understanding: patient states understanding of the procedure being performed  Site marked: the operative site was marked  Patient identity confirmed: verbally with patient  Supporting Documentation  Indications: pain     Is this a Visco injection? NoProcedure Details  Location: knee - R knee  Preparation: Patient was prepped and draped in the usual sterile fashion  Needle size: 22 G  Ultrasound guidance: no  Approach: anterolateral  Medications administered: 7 mL lidocaine (PF) 1 %; 6 mg betamethasone acetate-betamethasone sodium phosphate 6 (3-3) mg/mL    Patient tolerance: patient tolerated the procedure well with no immediate complications  Dressing:  Sterile dressing applied        Scribe Attestation      I,:  Adin La am acting as a scribe while in the presence of the attending physician.:       I,:  Priya Abreu MD personally performed the services described in this documentation    as scribed in my presence.:

## 2025-04-23 NOTE — ASSESSMENT & PLAN NOTE
Findings consistent with right knee osteoarthritis.  Right knee x-ray reviewed in office with patient today. Prognosis of her condition reviewed. Discussed conservative treatment options today. Patient was given cortisone injection today, tolerated procedure well, cold compress today. Repeat in 3-4 months if needed. She can call in 4 weeks if pain continues and referral can be placed for gel injections. Stationary bike, elliptical for low impact exercise.  Over-the-counter NSAID, topical voltaren gel for pain control. Discussed she is not surgical candidate due to BMI and recommend discussing with PCP about options for weight loss. Patient declined referral to pain management. She will hold 3 month date for CSI if needed. All patient's questions were answered to her satisfaction.  This note is created using dictation transcription.  It may contain typographical errors, grammatical errors, improperly dictated words, background noise and other errors.

## 2025-05-23 ENCOUNTER — TELEPHONE (OUTPATIENT)
Age: 55
End: 2025-05-23

## 2025-05-23 DIAGNOSIS — M17.11 PRIMARY OSTEOARTHRITIS OF RIGHT KNEE: Primary | ICD-10-CM

## 2025-05-23 NOTE — TELEPHONE ENCOUNTER
Caller: Cleopatra  Doctor/office: Dr Abreu / [facility]  #: [phone]      [employer] called to start auth/delivery for VISCO(Gel) injections.    R/L/Bilateral knee: Right  Pain Level (scale 1-10): 9  Date of last Gel Injection: has not had one   Did the last injection work well for you? N/A  Have you taken                   NSAIDS (Ibuprofen, Meloxicam, Naproxen)? yes                   Tylenol? yes  Have you done Home exercises/Physical Therapy? no  Have you had a cortisone/steroid injection? yes  4/23/2025    Educated patient on Visco procedure. Auth team will review insurance and process the request appropriately. Patient will be contacted if the have any questions and when ready to schedule.

## 2025-06-05 ENCOUNTER — PROCEDURE VISIT (OUTPATIENT)
Dept: OBGYN CLINIC | Facility: CLINIC | Age: 55
End: 2025-06-05
Payer: COMMERCIAL

## 2025-06-05 VITALS — HEIGHT: 61 IN | WEIGHT: 253 LBS | BODY MASS INDEX: 47.77 KG/M2

## 2025-06-05 DIAGNOSIS — M17.11 PRIMARY OSTEOARTHRITIS OF RIGHT KNEE: Primary | ICD-10-CM

## 2025-06-05 PROCEDURE — 20610 DRAIN/INJ JOINT/BURSA W/O US: CPT | Performed by: PHYSICIAN ASSISTANT

## 2025-06-05 NOTE — PROGRESS NOTES
Right knee Synvisc #1    Large joint arthrocentesis: R knee    Performed by: Adela Cordova PA-C  Authorized by: Adela Cordova PA-C    Universal Protocol:  Consent: Verbal consent obtained  Risks and benefits: risks, benefits and alternatives were discussed  Consent given by: patient  Patient understanding: patient states understanding of the procedure being performed  Supporting Documentation  Indications: pain     Is this a Visco injection? Yes  Non-Pharmacologic Treatments Attempted: Home Exercise  Pharmacologic Treatments Attempted: Tylenol, Ibuprofen, CBD cream  Pain Score: 8Procedure Details  Location: knee - R knee  Preparation: Patient was prepped and draped in the usual sterile fashion  Needle size: 22 G  Approach: anterolateral  Medications administered: 16 mg hylan 16 MG/2ML    Patient tolerance: patient tolerated the procedure well with no immediate complications  Dressing:  Sterile dressing applied

## 2025-06-12 ENCOUNTER — PROCEDURE VISIT (OUTPATIENT)
Dept: OBGYN CLINIC | Facility: CLINIC | Age: 55
End: 2025-06-12
Payer: COMMERCIAL

## 2025-06-12 VITALS — HEIGHT: 61 IN | WEIGHT: 253 LBS | BODY MASS INDEX: 47.77 KG/M2

## 2025-06-12 DIAGNOSIS — M17.11 PRIMARY OSTEOARTHRITIS OF RIGHT KNEE: Primary | ICD-10-CM

## 2025-06-12 PROCEDURE — 20610 DRAIN/INJ JOINT/BURSA W/O US: CPT | Performed by: PHYSICIAN ASSISTANT

## 2025-06-12 NOTE — PROGRESS NOTES
Right knee Synvisc #2    Large joint arthrocentesis: R knee    Performed by: Adela Cordova PA-C  Authorized by: Adela Cordova PA-C    Universal Protocol:  Consent: Verbal consent obtained  Risks and benefits: risks, benefits and alternatives were discussed  Consent given by: patient  Patient understanding: patient states understanding of the procedure being performed  Supporting Documentation  Indications: pain     Is this a Visco injection? Yes  Non-Pharmacologic Treatments Attempted: Home Exercise  Pharmacologic Treatments Attempted: Tylenol, Ibuprofen, CBD cream  Pain Score: 5Procedure Details  Location: knee - R knee  Preparation: Patient was prepped and draped in the usual sterile fashion  Needle size: 22 G  Approach: anterolateral  Medications administered: 16 mg hylan 16 MG/2ML    Patient tolerance: patient tolerated the procedure well with no immediate complications  Dressing:  Sterile dressing applied

## 2025-06-19 ENCOUNTER — PROCEDURE VISIT (OUTPATIENT)
Dept: OBGYN CLINIC | Facility: CLINIC | Age: 55
End: 2025-06-19
Payer: COMMERCIAL

## 2025-06-19 VITALS — HEIGHT: 61 IN | WEIGHT: 254 LBS | BODY MASS INDEX: 47.95 KG/M2

## 2025-06-19 DIAGNOSIS — M17.11 PRIMARY OSTEOARTHRITIS OF RIGHT KNEE: Primary | ICD-10-CM

## 2025-06-19 PROCEDURE — 20610 DRAIN/INJ JOINT/BURSA W/O US: CPT | Performed by: ORTHOPAEDIC SURGERY

## 2025-06-19 NOTE — PROGRESS NOTES
Right knee Synvisc #3    Large joint arthrocentesis: R knee    Performed by: Priya Abreu MD  Authorized by: Priya Abreu MD    Universal Protocol:  Consent: Verbal consent obtained  Risks and benefits: risks, benefits and alternatives were discussed  Consent given by: patient  Patient understanding: patient states understanding of the procedure being performed  Site marked: the operative site was marked  Supporting Documentation  Indications: pain     Is this a Visco injection? Yes  Non-Pharmacologic Treatments Attempted: Home Exercise  Pharmacologic Treatments Attempted: Tylenol, Ibuprofen, CBD cream  Pain Score: 4Procedure Details  Location: knee - R knee  Preparation: Patient was prepped and draped in the usual sterile fashion  Needle size: 22 G  Ultrasound guidance: no  Approach: anterolateral  Medications administered: 16 mg hylan 16 MG/2ML    Patient tolerance: patient tolerated the procedure well with no immediate complications  Dressing:  Sterile dressing applied